# Patient Record
Sex: MALE | Race: WHITE | NOT HISPANIC OR LATINO | URBAN - METROPOLITAN AREA
[De-identification: names, ages, dates, MRNs, and addresses within clinical notes are randomized per-mention and may not be internally consistent; named-entity substitution may affect disease eponyms.]

---

## 2018-09-28 ENCOUNTER — INPATIENT (INPATIENT)
Facility: HOSPITAL | Age: 68
LOS: 1 days | Discharge: HOME | DRG: 603 | End: 2018-09-30
Attending: INTERNAL MEDICINE | Admitting: INTERNAL MEDICINE
Payer: MEDICARE

## 2018-09-28 VITALS
DIASTOLIC BLOOD PRESSURE: 100 MMHG | OXYGEN SATURATION: 96 % | TEMPERATURE: 98 F | SYSTOLIC BLOOD PRESSURE: 183 MMHG | RESPIRATION RATE: 20 BRPM | HEART RATE: 87 BPM

## 2018-09-28 DIAGNOSIS — L03.113 CELLULITIS OF RIGHT UPPER LIMB: ICD-10-CM

## 2018-09-28 LAB
ALBUMIN SERPL ELPH-MCNC: 3.9 G/DL — SIGNIFICANT CHANGE UP (ref 3.3–5)
ALP SERPL-CCNC: 53 U/L — SIGNIFICANT CHANGE UP (ref 40–120)
ALT FLD-CCNC: 9 U/L — LOW (ref 10–45)
ANION GAP SERPL CALC-SCNC: 13 MMOL/L — SIGNIFICANT CHANGE UP (ref 5–17)
AST SERPL-CCNC: 19 U/L — SIGNIFICANT CHANGE UP (ref 10–40)
BASOPHILS # BLD AUTO: 0.1 K/UL — SIGNIFICANT CHANGE UP (ref 0–0.2)
BASOPHILS NFR BLD AUTO: 0.5 % — SIGNIFICANT CHANGE UP (ref 0–2)
BILIRUB SERPL-MCNC: 0.5 MG/DL — SIGNIFICANT CHANGE UP (ref 0.2–1.2)
BUN SERPL-MCNC: 12 MG/DL — SIGNIFICANT CHANGE UP (ref 7–23)
CALCIUM SERPL-MCNC: 9.2 MG/DL — SIGNIFICANT CHANGE UP (ref 8.4–10.5)
CHLORIDE SERPL-SCNC: 102 MMOL/L — SIGNIFICANT CHANGE UP (ref 96–108)
CO2 SERPL-SCNC: 24 MMOL/L — SIGNIFICANT CHANGE UP (ref 22–31)
CREAT SERPL-MCNC: 0.87 MG/DL — SIGNIFICANT CHANGE UP (ref 0.5–1.3)
EOSINOPHIL # BLD AUTO: 0.1 K/UL — SIGNIFICANT CHANGE UP (ref 0–0.5)
EOSINOPHIL NFR BLD AUTO: 0.7 % — SIGNIFICANT CHANGE UP (ref 0–6)
GLUCOSE SERPL-MCNC: 89 MG/DL — SIGNIFICANT CHANGE UP (ref 70–99)
HCT VFR BLD CALC: 35.4 % — LOW (ref 39–50)
HGB BLD-MCNC: 11.9 G/DL — LOW (ref 13–17)
LYMPHOCYTES # BLD AUTO: 1.1 K/UL — SIGNIFICANT CHANGE UP (ref 1–3.3)
LYMPHOCYTES # BLD AUTO: 9 % — LOW (ref 13–44)
MCHC RBC-ENTMCNC: 33.5 GM/DL — SIGNIFICANT CHANGE UP (ref 32–36)
MCHC RBC-ENTMCNC: 34.9 PG — HIGH (ref 27–34)
MCV RBC AUTO: 104 FL — HIGH (ref 80–100)
MONOCYTES # BLD AUTO: 1.3 K/UL — HIGH (ref 0–0.9)
MONOCYTES NFR BLD AUTO: 10.6 % — SIGNIFICANT CHANGE UP (ref 2–14)
NEUTROPHILS # BLD AUTO: 9.8 K/UL — HIGH (ref 1.8–7.4)
NEUTROPHILS NFR BLD AUTO: 79.3 % — HIGH (ref 43–77)
PLATELET # BLD AUTO: 186 K/UL — SIGNIFICANT CHANGE UP (ref 150–400)
POTASSIUM SERPL-MCNC: 4.2 MMOL/L — SIGNIFICANT CHANGE UP (ref 3.5–5.3)
POTASSIUM SERPL-SCNC: 4.2 MMOL/L — SIGNIFICANT CHANGE UP (ref 3.5–5.3)
PROT SERPL-MCNC: 6.7 G/DL — SIGNIFICANT CHANGE UP (ref 6–8.3)
RBC # BLD: 3.4 M/UL — LOW (ref 4.2–5.8)
RBC # FLD: 12.5 % — SIGNIFICANT CHANGE UP (ref 10.3–14.5)
SODIUM SERPL-SCNC: 139 MMOL/L — SIGNIFICANT CHANGE UP (ref 135–145)
WBC # BLD: 12.3 K/UL — HIGH (ref 3.8–10.5)
WBC # FLD AUTO: 12.3 K/UL — HIGH (ref 3.8–10.5)

## 2018-09-28 PROCEDURE — 99285 EMERGENCY DEPT VISIT HI MDM: CPT

## 2018-09-28 PROCEDURE — 99223 1ST HOSP IP/OBS HIGH 75: CPT

## 2018-09-28 RX ORDER — AMPICILLIN SODIUM AND SULBACTAM SODIUM 250; 125 MG/ML; MG/ML
3 INJECTION, POWDER, FOR SUSPENSION INTRAMUSCULAR; INTRAVENOUS ONCE
Qty: 0 | Refills: 0 | Status: COMPLETED | OUTPATIENT
Start: 2018-09-28 | End: 2018-09-28

## 2018-09-28 RX ADMIN — AMPICILLIN SODIUM AND SULBACTAM SODIUM 200 GRAM(S): 250; 125 INJECTION, POWDER, FOR SUSPENSION INTRAMUSCULAR; INTRAVENOUS at 23:24

## 2018-09-28 NOTE — H&P ADULT - PROBLEM SELECTOR PLAN 5
--on ibrutinib for several years, recent relapse. [place] [facility] [place]   --patient is unsure of his dose of ibrutinib but takes 3 tablets at bedtime. Called his pharmacy, but they do not have the prescription on file. Seems his dose is 420mg daily (120mg tabs). Asked him to confirm with his family.  --Given limited signs of systemic infection, ok to continue at this point. --on ibrutinib for several years, recent relapse. Follow at [facility].   --patient is unsure of his dose of ibrutinib but takes 3 tablets at bedtime. Called his pharmacy, but they do not have the prescription on file. Seems his dose is likely 420mg daily (120mg tabs). Asked him to confirm with his family. Spoke to pharmacy and they will need confirmation from his oncologist before medication can be ordered.

## 2018-09-28 NOTE — H&P ADULT - NSHPLABSRESULTS_GEN_ALL_CORE
EKG and labs personally reviewed.     LABS:                        11.9   12.3  )-----------( 186      ( 28 Sep 2018 22:35 )             35.4     139  |  102  |  12  ----------------------------<  89  4.2   |  24  |  0.87    Ca    9.2      28 Sep 2018 22:35    TPro  6.7  /  Alb  3.9  /  TBili  0.5  /  DBili  x   /  AST  19  /  ALT  9<L>  /  AlkPhos  53      RADIOLOGY & ADDITIONAL TESTS:  Imaging Personally Reviewed:    Consultant(s) Notes Reviewed:  Yes, ED  Care Discussed with Consultants/Other Providers: Yes, Dr. Casanova    Placed call to his pharmacy [facility] for medication reconciliation.

## 2018-09-28 NOTE — H&P ADULT - NSHPOUTPATIENTPROVIDERS_GEN_ALL_CORE
Dr. Abraham ([facility] [place] hematology)  Dr. Brady ([facility] dermatology)

## 2018-09-28 NOTE — H&P ADULT - HISTORY OF PRESENT ILLNESS
68M PMH NHL on ibrutinib presents with R hand rash/wound.     HPI:      PAST MEDICAL & SURGICAL HISTORY:  Anemia  History of prostate cancer  Preliminary Diagnosis of Lymphoma  H/O radical prostatectomy: February 2003      Review of Systems:   CONSTITUTIONAL: No fever, weight loss, or fatigue  EYES: No eye pain, visual disturbances, or discharge  ENMT:  No difficulty hearing, tinnitus, vertigo; No sinus or throat pain  NECK: No pain or stiffness  BREASTS: No pain, masses, or nipple discharge  RESPIRATORY: No cough, wheezing, chills or hemoptysis; No shortness of breath  CARDIOVASCULAR: No chest pain, palpitations, dizziness, or leg swelling  GASTROINTESTINAL: No abdominal or epigastric pain. No nausea, vomiting, or hematemesis; No diarrhea or constipation. No melena or hematochezia.  GENITOURINARY: No dysuria, frequency, hematuria, or incontinence  NEUROLOGICAL: No headaches, memory loss, loss of strength, numbness, or tremors  SKIN: No itching, burning, rashes, or lesions   LYMPH NODES: No enlarged glands  ENDOCRINE: No heat or cold intolerance; No hair loss  MUSCULOSKELETAL: No joint pain or swelling; No muscle, back, or extremity pain  PSYCHIATRIC: No depression, anxiety, mood swings, or difficulty sleeping  HEME/LYMPH: No easy bruising, or bleeding gums  ALLERY AND IMMUNOLOGIC: No hives or eczema  [ ] all other systems negative or as per HPI    Allergies    No Known Allergies    Intolerances        Social History:     FAMILY HISTORY:      MEDICATIONS  (STANDING):  allopurinol 100 milliGRAM(s) Oral at bedtime  ibrutinib 420 milliGRAM(s) Oral at bedtime  oxyCODONE    IR 5 milliGRAM(s) Oral once  sodium chloride 0.9% Bolus 1000 milliLiter(s) IV Bolus once  sodium chloride 0.9% Bolus 1000 milliLiter(s) IV Bolus once    MEDICATIONS  (PRN):  acetaminophen   Tablet .. 975 milliGRAM(s) Oral every 8 hours PRN Mild Pain (1 - 3), Moderate Pain (4 - 6)  oxyCODONE    IR 5 milliGRAM(s) Oral every 6 hours PRN Severe Pain (7 - 10)      Vital Signs Last 24 Hrs  T(C): 37.6 (09-29-18 @ 00:25)  T(F): 99.7 (09-29-18 @ 00:25), Max: 99.7 (09-29-18 @ 00:25)  HR: 78 (09-29-18 @ 00:25) (78 - 87)  BP: 170/87 (09-29-18 @ 00:25)  BP(mean): --  RR: 18 (09-29-18 @ 00:25) (17 - 20)  SpO2: 98% (09-29-18 @ 00:25) (96% - 100%)  Wt(kg): --    CAPILLARY BLOOD GLUCOSE          PHYSICAL EXAM:  GENERAL: NAD, well-developed  HEAD:  Atraumatic, Normocephalic  EYES: EOMI, PERRLA, conjunctiva and sclera clear  NECK: Supple, No JVD  CHEST/LUNG: Clear to auscultation bilaterally; No wheeze  HEART: Regular rate and rhythm; No murmurs, rubs, or gallops  ABDOMEN: Soft, Nontender, Nondistended; Bowel sounds present  EXTREMITIES:  2+ Peripheral Pulses, No clubbing, cyanosis, or edema  PSYCH: AAOx3  NEUROLOGY: non-focal  SKIN: No rashes or lesions 68M PMH NHL on ibrutinib, localized prostate ca s/p prostatectomy presents with R hand rash/wound. Started Wednesday with development of a blister on his R wrist. Subsequently ruptured, draining mostly bloody fluid. Then developed erythema and swelling spreading over hand and forearm, streaking up toward upper arm. Today while at work, started to develop moderate R shoulder aching. Not worse with movement, no decrease in strength or ROM. Patient reports discomfort in hand and forearm is minimal, has been able to fully use his hand. No numbness or tingling throughout arm or fingers. No joint pain or swelling. Has felt a little fatigued, but no fevers, chills, chest pain, SOB, NVD, LE edema. He follows with a dermatologist at [facility] [place] where he has had severe pre-malignant and malignant lesions being followed. About to start treatment with 5-FU topicals to some.

## 2018-09-28 NOTE — ED ADULT NURSE NOTE - CHIEF COMPLAINT
The patient is a 68y Male complaining of The patient is a 68y Male complaining of had redness The patient is a 68y Male complaining of hand redness

## 2018-09-28 NOTE — H&P ADULT - NSHPPHYSICALEXAM_GEN_ALL_CORE
Vital Signs Last 24 Hrs  T(C): 37.6 (09-29-18 @ 00:25)  T(F): 99.7 (09-29-18 @ 00:25), Max: 99.7 (09-29-18 @ 00:25)  HR: 78 (09-29-18 @ 00:25) (78 - 87)  BP: 170/87 (09-29-18 @ 00:25)  BP(mean): --  RR: 18 (09-29-18 @ 00:25) (17 - 20)  SpO2: 98% (09-29-18 @ 00:25) (96% - 100%)  Wt(kg): --    PHYSICAL EXAM:  GENERAL: NAD, well-developed  HEAD:  Atraumatic, Normocephalic  EYES: EOMI, PERRLA, conjunctiva and sclera clear  NECK: Supple, No JVD  CHEST/LUNG: Clear to auscultation bilaterally; No wheeze  HEART: Regular rate and rhythm; No murmurs, rubs, or gallops  ABDOMEN: Soft, Nontender, Nondistended; Bowel sounds present  EXTREMITIES:  2+ Peripheral Pulses, No clubbing, cyanosis, or edema  PSYCH: AAOx3  NEUROLOGY: non-focal  SKIN: No rashes or lesions Vital Signs Last 24 Hrs  T(C): 37.6 (09-29-18 @ 00:25)  T(F): 99.7 (09-29-18 @ 00:25), Max: 99.7 (09-29-18 @ 00:25)  HR: 78 (09-29-18 @ 00:25) (78 - 87)  BP: 170/87 (09-29-18 @ 00:25)  BP(mean): --  RR: 18 (09-29-18 @ 00:25) (17 - 20)  SpO2: 98% (09-29-18 @ 00:25) (96% - 100%)  Wt(kg): --    PHYSICAL EXAM:  GENERAL: NAD, well-developed, non-toxic  HEAD:  Atraumatic, Normocephalic  EYES: EOMI, PERRLA, conjunctiva and sclera clear  NECK: Supple, No JVD  CHEST/LUNG: Clear to auscultation bilaterally; No wheeze  HEART: Regular rate and rhythm; No murmurs, rubs, or gallops  ABDOMEN: Soft, Nontender, Nondistended; Bowel sounds present  EXTREMITIES:  2+ Peripheral Pulses, No clubbing, cyanosis, or edema; FROM R shoulder, no tenderness or swelling, normal strength  PSYCH: AAOx3  NEUROLOGY: non-focal  SKIN: 2 cm wide ruptured blister R wrist oozing some bloody liquid, erythema and swelling extending to approx MCP joints and extending up toward the elbow.

## 2018-09-28 NOTE — ED PROVIDER NOTE - ATTENDING CONTRIBUTION TO CARE
I have seen and evaluated this patient with the advance practice clinician.   I agree with the findings  unless other wise stated. I have amended notes where needed.  After my face to face bedside evaluation, I am noting: Pt with right arm redness and swelling that is worsening with an ulcer on lateral aspect of forearm Pt is immunosuppressed needs iv antibiotics will admit--puga

## 2018-09-28 NOTE — ED PROVIDER NOTE - SKIN, MLM
See [facility]. +scattered papular lesions on abdomen and single biopsy scar on left upper chest; Skin otherwise normal color for race, warm, dry and intact. No evidence of rash.

## 2018-09-28 NOTE — ED ADULT NURSE NOTE - OBJECTIVE STATEMENT
comes in with right hand swelling and redness with wound that looks like a popped blister comes in with right hand swelling and redness with wound that looks like a popped blister; pt does not recall and bug bites to the area or trauma to area; denies fever; reports upper back pain that started today; pt is fully alert and ambulatory comes in with right hand swelling and redness with wound that looks like a popped blister; noted x 2 days; pt does not recall and bug bites to the area or trauma to area; denies fever; reports upper back pain that started today; pt is fully alert and ambulatory

## 2018-09-28 NOTE — ED PROVIDER NOTE - MEDICAL DECISION MAKING DETAILS
Pt with right arm redness and swelling that is worsening with an ulcer on lateral aspect of forearm Pt is immunosuppressed needs iv antibiotics will admit--puga

## 2018-09-28 NOTE — ED PROVIDER NOTE - OBJECTIVE STATEMENT
67yo M with PMH NH Lymphoma with recent relapse on oral medications, presenting with wound to R hand with worsening redness and swelling of RUE x 2 days. Pt reports he noticed blister on R hand 2 days ago that popped. Pt put vaseline on it but R hand and wrist has had worsening swelling and redness, now has redness up R arm. Pt denies any known injury to hand, any pain of R hand/wrist/elbow, fever/chills, CP, SOB, abd pain, n/v, any other rash/lesions, numbness/tingling.     No PCP  Onc at [facility]

## 2018-09-28 NOTE — ED PROVIDER NOTE - EXTREMITY EXAM
right upper extremity findings/+ circular wound at dorsum of first MCP with erythematous base with surrounding edema of hand and wrist, +warmth, and + erythema streaking up to proximal forearm, + axillary LAD; full ROM of hand/wrist/elbow/shoulder intact, sensation intact, radial pulse 2+, cap refill < 2s

## 2018-09-28 NOTE — H&P ADULT - ASSESSMENT
68M PMH NHL on ibrutinib, localized prostate ca s/p prostatectomy presents with R hand rash/wound, admitted with cellulitis that is complicated due to immunocompromised status.

## 2018-09-28 NOTE — H&P ADULT - PMH
Anemia    History of prostate cancer    NHL (non-Hodgkin's lymphoma)    Preliminary Diagnosis of Lymphoma

## 2018-09-28 NOTE — H&P ADULT - PROBLEM SELECTOR PLAN 3
--no history of HTN, suspect secondary to pain  --continue to monitor off medication.  --will treat pain.

## 2018-09-28 NOTE — H&P ADULT - PROBLEM SELECTOR PLAN 1
--complicated given immunocompromise on oral chemotherapy  --wound culture sent in ED  --will cover with vancomycin for MRSA coverage given drainage from superficial lesion (not an abscess, more like ruptured bulla)  --BCx sent in ED --complicated given immunocompromise on oral chemotherapy  --wound culture sent in ED  --will cover with vancomycin for MRSA coverage given drainage from superficial lesion (not an abscess, more like ruptured bulla)  --BCx sent in ED  --consider dermatology input in the morning regarding ruptured bulla, but he can also follow up with his dermatologist whom he sees frequently

## 2018-09-28 NOTE — H&P ADULT - PROBLEM SELECTOR PLAN 2
--on ibrutinib for many years. Given limited signs of systemic infection, will continue. --given immunocompromise on chemotherapy, requires admission for reassessment of cellulitis and response to IV antibiotic.

## 2018-09-28 NOTE — ED ADULT NURSE NOTE - NSIMPLEMENTINTERV_GEN_ALL_ED
Implemented All Universal Safety Interventions:  [place] to call system. Call bell, personal items and telephone within reach. Instruct patient to call for assistance. Room bathroom lighting operational. Non-slip footwear when patient is off stretcher. Physically safe environment: no spills, clutter or unnecessary equipment. Stretcher in lowest position, wheels locked, appropriate side rails in place.

## 2018-09-28 NOTE — H&P ADULT - PROBLEM SELECTOR PLAN 4
--suspect referred from RUE infection. Normal physical exam.  --tylenol and oxycodone for pain, reassess in the morning.

## 2018-09-28 NOTE — H&P ADULT - NSHPREVIEWOFSYSTEMS_GEN_ALL_CORE
Review of Systems:   CONSTITUTIONAL: No fever, weight loss, or fatigue  EYES: No eye pain, visual disturbances, or discharge  ENMT:  No difficulty hearing, tinnitus, vertigo; No sinus or throat pain  NECK: No pain or stiffness  BREASTS: No pain, masses, or nipple discharge  RESPIRATORY: No cough, wheezing, chills or hemoptysis; No shortness of breath  CARDIOVASCULAR: No chest pain, palpitations, dizziness, or leg swelling  GASTROINTESTINAL: No abdominal or epigastric pain. No nausea, vomiting, or hematemesis; No diarrhea or constipation. No melena or hematochezia.  GENITOURINARY: No dysuria, frequency, hematuria, or incontinence  NEUROLOGICAL: No headaches, memory loss, loss of strength, numbness, or tremors  SKIN: No itching, burning, rashes, or lesions   LYMPH NODES: No enlarged glands  ENDOCRINE: No heat or cold intolerance; No hair loss  MUSCULOSKELETAL: No joint pain or swelling; No muscle, back, or extremity pain  PSYCHIATRIC: No depression, anxiety, mood swings, or difficulty sleeping  HEME/LYMPH: No easy bruising, or bleeding gums  ALLERY AND IMMUNOLOGIC: No hives or eczema  [ ] all other systems negative or as per HPI Review of Systems:   CONSTITUTIONAL: +fatigue; No fever, weight loss  EYES: No eye pain, visual disturbances, or discharge  ENMT:  No difficulty hearing, tinnitus, vertigo; No sinus or throat pain  NECK: No pain or stiffness  BREASTS: No pain, masses, or nipple discharge  RESPIRATORY: No cough, wheezing, chills or hemoptysis; No shortness of breath  CARDIOVASCULAR: No chest pain, palpitations, dizziness, or leg swelling  GASTROINTESTINAL: No abdominal or epigastric pain. No nausea, vomiting, or hematemesis; No diarrhea or constipation. No melena or hematochezia.  GENITOURINARY: No dysuria, frequency, hematuria, or incontinence  NEUROLOGICAL: No headaches, memory loss, loss of strength, numbness, or tremors  SKIN: +blister, rash, warmth   LYMPH NODES: No enlarged glands  ENDOCRINE: No heat or cold intolerance; No hair loss  MUSCULOSKELETAL: No joint pain or swelling; No muscle, back, or extremity pain  PSYCHIATRIC: No depression, anxiety, mood swings, or difficulty sleeping  HEME/LYMPH: No easy bruising, or bleeding gums  ALLERY AND IMMUNOLOGIC: No hives or eczema  [ ] all other systems negative or as per HPI

## 2018-09-29 DIAGNOSIS — I10 ESSENTIAL (PRIMARY) HYPERTENSION: ICD-10-CM

## 2018-09-29 DIAGNOSIS — Z98.890 OTHER SPECIFIED POSTPROCEDURAL STATES: Chronic | ICD-10-CM

## 2018-09-29 DIAGNOSIS — C85.90 NON-HODGKIN LYMPHOMA, UNSPECIFIED, UNSPECIFIED SITE: ICD-10-CM

## 2018-09-29 DIAGNOSIS — L03.113 CELLULITIS OF RIGHT UPPER LIMB: ICD-10-CM

## 2018-09-29 DIAGNOSIS — M25.519 PAIN IN UNSPECIFIED SHOULDER: ICD-10-CM

## 2018-09-29 DIAGNOSIS — Z29.9 ENCOUNTER FOR PROPHYLACTIC MEASURES, UNSPECIFIED: ICD-10-CM

## 2018-09-29 DIAGNOSIS — D84.9 IMMUNODEFICIENCY, UNSPECIFIED: ICD-10-CM

## 2018-09-29 LAB
ANION GAP SERPL CALC-SCNC: 9 MMOL/L — SIGNIFICANT CHANGE UP (ref 5–17)
BASE EXCESS BLDV CALC-SCNC: -0.5 MMOL/L — SIGNIFICANT CHANGE UP (ref -2–2)
BUN SERPL-MCNC: 9 MG/DL — SIGNIFICANT CHANGE UP (ref 7–23)
CA-I SERPL-SCNC: 1.05 MMOL/L — LOW (ref 1.12–1.3)
CALCIUM SERPL-MCNC: 9.4 MG/DL — SIGNIFICANT CHANGE UP (ref 8.4–10.5)
CHLORIDE BLDV-SCNC: 112 MMOL/L — HIGH (ref 96–108)
CHLORIDE SERPL-SCNC: 101 MMOL/L — SIGNIFICANT CHANGE UP (ref 96–108)
CO2 BLDV-SCNC: 24 MMOL/L — SIGNIFICANT CHANGE UP (ref 22–30)
CO2 SERPL-SCNC: 25 MMOL/L — SIGNIFICANT CHANGE UP (ref 22–31)
CREAT SERPL-MCNC: 0.83 MG/DL — SIGNIFICANT CHANGE UP (ref 0.5–1.3)
GAS PNL BLDV: 138 MMOL/L — SIGNIFICANT CHANGE UP (ref 136–145)
GAS PNL BLDV: SIGNIFICANT CHANGE UP
GAS PNL BLDV: SIGNIFICANT CHANGE UP
GLUCOSE BLDV-MCNC: 76 MG/DL — SIGNIFICANT CHANGE UP (ref 70–99)
GLUCOSE SERPL-MCNC: 131 MG/DL — HIGH (ref 70–99)
HCO3 BLDV-SCNC: 23 MMOL/L — SIGNIFICANT CHANGE UP (ref 21–29)
HCT VFR BLD CALC: 37 % — LOW (ref 39–50)
HCT VFR BLDA CALC: 32 % — LOW (ref 39–50)
HGB BLD CALC-MCNC: 10.2 G/DL — LOW (ref 13–17)
HGB BLD-MCNC: 12.3 G/DL — LOW (ref 13–17)
LACTATE BLDV-MCNC: 0.9 MMOL/L — SIGNIFICANT CHANGE UP (ref 0.7–2)
MCHC RBC-ENTMCNC: 33.2 GM/DL — SIGNIFICANT CHANGE UP (ref 32–36)
MCHC RBC-ENTMCNC: 34.1 PG — HIGH (ref 27–34)
MCV RBC AUTO: 102.5 FL — HIGH (ref 80–100)
OTHER CELLS CSF MANUAL: 7 ML/DL — LOW (ref 18–22)
PCO2 BLDV: 36 MMHG — SIGNIFICANT CHANGE UP (ref 35–50)
PH BLDV: 7.42 — SIGNIFICANT CHANGE UP (ref 7.35–7.45)
PLATELET # BLD AUTO: 200 K/UL — SIGNIFICANT CHANGE UP (ref 150–400)
PO2 BLDV: 28 MMHG — SIGNIFICANT CHANGE UP (ref 25–45)
POTASSIUM BLDV-SCNC: 3.2 MMOL/L — LOW (ref 3.5–5.3)
POTASSIUM SERPL-MCNC: 4 MMOL/L — SIGNIFICANT CHANGE UP (ref 3.5–5.3)
POTASSIUM SERPL-SCNC: 4 MMOL/L — SIGNIFICANT CHANGE UP (ref 3.5–5.3)
RBC # BLD: 3.61 M/UL — LOW (ref 4.2–5.8)
RBC # FLD: 13.8 % — SIGNIFICANT CHANGE UP (ref 10.3–14.5)
SAO2 % BLDV: 47 % — LOW (ref 67–88)
SODIUM SERPL-SCNC: 135 MMOL/L — SIGNIFICANT CHANGE UP (ref 135–145)
WBC # BLD: 9.2 K/UL — SIGNIFICANT CHANGE UP (ref 3.8–10.5)
WBC # FLD AUTO: 9.2 K/UL — SIGNIFICANT CHANGE UP (ref 3.8–10.5)

## 2018-09-29 PROCEDURE — 99233 SBSQ HOSP IP/OBS HIGH 50: CPT

## 2018-09-29 RX ORDER — ACETAMINOPHEN 500 MG
975 TABLET ORAL EVERY 8 HOURS
Qty: 0 | Refills: 0 | Status: DISCONTINUED | OUTPATIENT
Start: 2018-09-29 | End: 2018-09-30

## 2018-09-29 RX ORDER — IBRUTINIB 140 MG/1
3 TABLET, FILM COATED ORAL
Qty: 0 | Refills: 0 | COMMUNITY

## 2018-09-29 RX ORDER — OXYCODONE HYDROCHLORIDE 5 MG/1
5 TABLET ORAL ONCE
Qty: 0 | Refills: 0 | Status: DISCONTINUED | OUTPATIENT
Start: 2018-09-29 | End: 2018-09-29

## 2018-09-29 RX ORDER — ENOXAPARIN SODIUM 100 MG/ML
40 INJECTION SUBCUTANEOUS EVERY 24 HOURS
Qty: 0 | Refills: 0 | Status: DISCONTINUED | OUTPATIENT
Start: 2018-09-29 | End: 2018-09-30

## 2018-09-29 RX ORDER — IBRUTINIB 140 MG/1
420 TABLET, FILM COATED ORAL AT BEDTIME
Qty: 0 | Refills: 0 | Status: DISCONTINUED | OUTPATIENT
Start: 2018-09-29 | End: 2018-09-30

## 2018-09-29 RX ORDER — IBRUTINIB 140 MG/1
0 TABLET, FILM COATED ORAL
Qty: 0 | Refills: 0 | COMMUNITY

## 2018-09-29 RX ORDER — OXYCODONE HYDROCHLORIDE 5 MG/1
5 TABLET ORAL EVERY 6 HOURS
Qty: 0 | Refills: 0 | Status: DISCONTINUED | OUTPATIENT
Start: 2018-09-29 | End: 2018-09-30

## 2018-09-29 RX ORDER — SODIUM CHLORIDE 9 MG/ML
1000 INJECTION INTRAMUSCULAR; INTRAVENOUS; SUBCUTANEOUS ONCE
Qty: 0 | Refills: 0 | Status: COMPLETED | OUTPATIENT
Start: 2018-09-29 | End: 2018-09-29

## 2018-09-29 RX ORDER — ALLOPURINOL 300 MG
100 TABLET ORAL AT BEDTIME
Qty: 0 | Refills: 0 | Status: DISCONTINUED | OUTPATIENT
Start: 2018-09-29 | End: 2018-09-30

## 2018-09-29 RX ORDER — ALLOPURINOL 300 MG
1 TABLET ORAL
Qty: 0 | Refills: 0 | COMMUNITY

## 2018-09-29 RX ORDER — VANCOMYCIN HCL 1 G
1000 VIAL (EA) INTRAVENOUS EVERY 12 HOURS
Qty: 0 | Refills: 0 | Status: DISCONTINUED | OUTPATIENT
Start: 2018-09-29 | End: 2018-09-30

## 2018-09-29 RX ORDER — MUPIROCIN 20 MG/G
1 OINTMENT TOPICAL
Qty: 0 | Refills: 0 | Status: DISCONTINUED | OUTPATIENT
Start: 2018-09-29 | End: 2018-09-30

## 2018-09-29 RX ORDER — ALLOPURINOL 300 MG
0 TABLET ORAL
Qty: 0 | Refills: 0 | COMMUNITY

## 2018-09-29 RX ORDER — INFLUENZA VIRUS VACCINE 15; 15; 15; 15 UG/.5ML; UG/.5ML; UG/.5ML; UG/.5ML
0.5 SUSPENSION INTRAMUSCULAR ONCE
Qty: 0 | Refills: 0 | Status: DISCONTINUED | OUTPATIENT
Start: 2018-09-29 | End: 2018-09-30

## 2018-09-29 RX ADMIN — Medication 250 MILLIGRAM(S): at 17:28

## 2018-09-29 RX ADMIN — OXYCODONE HYDROCHLORIDE 5 MILLIGRAM(S): 5 TABLET ORAL at 01:18

## 2018-09-29 RX ADMIN — MUPIROCIN 1 APPLICATION(S): 20 OINTMENT TOPICAL at 17:28

## 2018-09-29 RX ADMIN — Medication 250 MILLIGRAM(S): at 05:53

## 2018-09-29 RX ADMIN — SODIUM CHLORIDE 1000 MILLILITER(S): 9 INJECTION INTRAMUSCULAR; INTRAVENOUS; SUBCUTANEOUS at 01:23

## 2018-09-29 RX ADMIN — Medication 975 MILLIGRAM(S): at 22:01

## 2018-09-29 RX ADMIN — ENOXAPARIN SODIUM 40 MILLIGRAM(S): 100 INJECTION SUBCUTANEOUS at 05:53

## 2018-09-29 RX ADMIN — Medication 100 MILLIGRAM(S): at 21:55

## 2018-09-29 RX ADMIN — Medication 975 MILLIGRAM(S): at 22:47

## 2018-09-29 RX ADMIN — OXYCODONE HYDROCHLORIDE 5 MILLIGRAM(S): 5 TABLET ORAL at 19:30

## 2018-09-29 RX ADMIN — OXYCODONE HYDROCHLORIDE 5 MILLIGRAM(S): 5 TABLET ORAL at 18:33

## 2018-09-29 RX ADMIN — AMPICILLIN SODIUM AND SULBACTAM SODIUM 3 GRAM(S): 250; 125 INJECTION, POWDER, FOR SUSPENSION INTRAMUSCULAR; INTRAVENOUS at 00:12

## 2018-09-29 RX ADMIN — MUPIROCIN 1 APPLICATION(S): 20 OINTMENT TOPICAL at 05:53

## 2018-09-29 NOTE — PROGRESS NOTE ADULT - SUBJECTIVE AND OBJECTIVE BOX
Stephen Layne MD  Division of Hospital Medicine  Pager [phone]      ANGELICA MONTOYA  68y  Male      Patient is a 68y old  Male who presents with a chief complaint of Right arm cellulitis (29 Sep 2018 02:24)      INTERVAL HPI/OVERNIGHT EVENTS:  Seen at bedside. Reporting some improvement in redness and swelling in arm. No fevers, chills. Shoulder pain improved      REVIEW OF SYSTEMS: 14 point ROS negative unless listed above    T(C): 37 (09-29-18 @ 07:59), Max: 37.6 (09-29-18 @ 00:25)  HR: 73 (09-29-18 @ 07:59) (73 - 87)  BP: 165/77 (09-29-18 @ 07:59) (159/76 - 183/100)  RR: 18 (09-29-18 @ 07:59) (17 - 20)  SpO2: 97% (09-29-18 @ 07:59) (95% - 100%)  Wt(kg): --Vital Signs Last 24 Hrs  T(C): 37 (29 Sep 2018 07:59), Max: 37.6 (29 Sep 2018 00:25)  T(F): 98.6 (29 Sep 2018 07:59), Max: 99.7 (29 Sep 2018 00:25)  HR: 73 (29 Sep 2018 07:59) (73 - 87)  BP: 165/77 (29 Sep 2018 07:59) (159/76 - 183/100)  BP(mean): --  RR: 18 (29 Sep 2018 07:59) (17 - 20)  SpO2: 97% (29 Sep 2018 07:59) (95% - 100%)    PHYSICAL EXAM:  GENERAL: NAD, well-developed, non-toxic  NECK: Supple, No JVD  CHEST/LUNG: Clear to auscultation bilaterally; No wheeze  HEART: Regular rate and rhythm; No murmurs, rubs, or gallops  ABDOMEN: Soft, Nontender, Nondistended; Bowel sounds present  EXTREMITIES:  2+ Peripheral Pulses, No clubbing, cyanosis, or edema; FROM R shoulder, no tenderness or swelling, normal strength  PSYCH: AAOx3  NEUROLOGY: non-focal  SKIN: 2 cm ruptured blister on R wrist oozing some bloody liquid, erythema and swelling extending to approx MCP joints and extending up toward the elbow. Area demarcated, improved    Consultant(s) Notes Reviewed:  [x ] YES  [ ] NO  Care Discussed with Consultants/Other Providers [ x] YES  [ ] NO    LABS:                        12.3   9.20  )-----------( 200      ( 29 Sep 2018 10:55 )             37.0     09-29    135  |  101  |  9   ----------------------------<  131<H>  4.0   |  25  |  0.83    Ca    9.4      29 Sep 2018 09:43    TPro  6.7  /  Alb  3.9  /  TBili  0.5  /  DBili  x   /  AST  19  /  ALT  9<L>  /  AlkPhos  53  09-28        CAPILLARY BLOOD GLUCOSE                RADIOLOGY & ADDITIONAL TESTS:    Imaging Personally Reviewed:  [x ] YES  [ ] NO

## 2018-09-29 NOTE — PROGRESS NOTE ADULT - PROBLEM SELECTOR PLAN 5
--on ibrutinib for several years, recent relapse. Follow at [facility].   --patient is unsure of his dose of ibrutinib but takes 3 tablets at bedtime. Son to bring in medication today  -- Has appointment with new oncologist Monday as his current oncologist is retiring

## 2018-09-29 NOTE — PROGRESS NOTE ADULT - PROBLEM SELECTOR PLAN 4
--suspect referred from RUE infection. No notable findings on physical examination  --tylenol and oxycodone for pain  -- Warm compresses

## 2018-09-29 NOTE — PROGRESS NOTE ADULT - PROBLEM SELECTOR PLAN 1
--complicated given immunocompromise on oral chemotherapy. Currently on vancomycin to cover for purulent cellulitis given immunocompromised status and drainage from skin blister. Leukocytosis resolved  --wound culture sent in ED  -- f/u BCx  - Wound care consult

## 2018-09-29 NOTE — PROGRESS NOTE ADULT - PROBLEM SELECTOR PLAN 2
--given immunocompromise on chemotherapy, requires admission for reassessment of cellulitis and response to IV antibiotic.

## 2018-09-30 ENCOUNTER — TRANSCRIPTION ENCOUNTER (OUTPATIENT)
Age: 68
End: 2018-09-30

## 2018-09-30 VITALS
SYSTOLIC BLOOD PRESSURE: 160 MMHG | DIASTOLIC BLOOD PRESSURE: 75 MMHG | RESPIRATION RATE: 18 BRPM | HEART RATE: 66 BPM | OXYGEN SATURATION: 98 % | TEMPERATURE: 99 F

## 2018-09-30 LAB
ANION GAP SERPL CALC-SCNC: 13 MMOL/L — SIGNIFICANT CHANGE UP (ref 5–17)
BUN SERPL-MCNC: 10 MG/DL — SIGNIFICANT CHANGE UP (ref 7–23)
CALCIUM SERPL-MCNC: 8.9 MG/DL — SIGNIFICANT CHANGE UP (ref 8.4–10.5)
CHLORIDE SERPL-SCNC: 103 MMOL/L — SIGNIFICANT CHANGE UP (ref 96–108)
CO2 SERPL-SCNC: 21 MMOL/L — LOW (ref 22–31)
CREAT SERPL-MCNC: 0.88 MG/DL — SIGNIFICANT CHANGE UP (ref 0.5–1.3)
CULTURE RESULTS: SIGNIFICANT CHANGE UP
GLUCOSE SERPL-MCNC: 88 MG/DL — SIGNIFICANT CHANGE UP (ref 70–99)
HCT VFR BLD CALC: 32.1 % — LOW (ref 39–50)
HGB BLD-MCNC: 10.6 G/DL — LOW (ref 13–17)
MCHC RBC-ENTMCNC: 33 GM/DL — SIGNIFICANT CHANGE UP (ref 32–36)
MCHC RBC-ENTMCNC: 34 PG — SIGNIFICANT CHANGE UP (ref 27–34)
MCV RBC AUTO: 102.9 FL — HIGH (ref 80–100)
PLATELET # BLD AUTO: 178 K/UL — SIGNIFICANT CHANGE UP (ref 150–400)
POTASSIUM SERPL-MCNC: 3.6 MMOL/L — SIGNIFICANT CHANGE UP (ref 3.5–5.3)
POTASSIUM SERPL-SCNC: 3.6 MMOL/L — SIGNIFICANT CHANGE UP (ref 3.5–5.3)
RBC # BLD: 3.12 M/UL — LOW (ref 4.2–5.8)
RBC # FLD: 13.8 % — SIGNIFICANT CHANGE UP (ref 10.3–14.5)
SODIUM SERPL-SCNC: 137 MMOL/L — SIGNIFICANT CHANGE UP (ref 135–145)
SPECIMEN SOURCE: SIGNIFICANT CHANGE UP
WBC # BLD: 7.54 K/UL — SIGNIFICANT CHANGE UP (ref 3.8–10.5)
WBC # FLD AUTO: 7.54 K/UL — SIGNIFICANT CHANGE UP (ref 3.8–10.5)

## 2018-09-30 PROCEDURE — 85027 COMPLETE CBC AUTOMATED: CPT

## 2018-09-30 PROCEDURE — 84132 ASSAY OF SERUM POTASSIUM: CPT

## 2018-09-30 PROCEDURE — 82803 BLOOD GASES ANY COMBINATION: CPT

## 2018-09-30 PROCEDURE — 99239 HOSP IP/OBS DSCHRG MGMT >30: CPT

## 2018-09-30 PROCEDURE — 82947 ASSAY GLUCOSE BLOOD QUANT: CPT

## 2018-09-30 PROCEDURE — 80048 BASIC METABOLIC PNL TOTAL CA: CPT

## 2018-09-30 PROCEDURE — 84295 ASSAY OF SERUM SODIUM: CPT

## 2018-09-30 PROCEDURE — 87040 BLOOD CULTURE FOR BACTERIA: CPT

## 2018-09-30 PROCEDURE — 82435 ASSAY OF BLOOD CHLORIDE: CPT

## 2018-09-30 PROCEDURE — 85014 HEMATOCRIT: CPT

## 2018-09-30 PROCEDURE — 99285 EMERGENCY DEPT VISIT HI MDM: CPT | Mod: 25

## 2018-09-30 PROCEDURE — 80053 COMPREHEN METABOLIC PANEL: CPT

## 2018-09-30 PROCEDURE — 82330 ASSAY OF CALCIUM: CPT

## 2018-09-30 PROCEDURE — 96374 THER/PROPH/DIAG INJ IV PUSH: CPT

## 2018-09-30 PROCEDURE — 83605 ASSAY OF LACTIC ACID: CPT

## 2018-09-30 PROCEDURE — 87070 CULTURE OTHR SPECIMN AEROBIC: CPT

## 2018-09-30 RX ORDER — ACETAMINOPHEN 500 MG
3 TABLET ORAL
Qty: 45 | Refills: 0 | OUTPATIENT
Start: 2018-09-30 | End: 2018-10-04

## 2018-09-30 RX ORDER — MUPIROCIN 20 MG/G
1 OINTMENT TOPICAL
Qty: 90 | Refills: 0 | OUTPATIENT
Start: 2018-09-30 | End: 2018-10-09

## 2018-09-30 RX ADMIN — Medication 250 MILLIGRAM(S): at 05:33

## 2018-09-30 RX ADMIN — ENOXAPARIN SODIUM 40 MILLIGRAM(S): 100 INJECTION SUBCUTANEOUS at 05:33

## 2018-09-30 RX ADMIN — MUPIROCIN 1 APPLICATION(S): 20 OINTMENT TOPICAL at 05:41

## 2018-09-30 RX ADMIN — Medication 975 MILLIGRAM(S): at 07:53

## 2018-09-30 NOTE — DISCHARGE NOTE ADULT - PATIENT PORTAL LINK FT
You can access the [facility], offered by [facility], by registering with the following website: [email or web address]

## 2018-09-30 NOTE — DISCHARGE NOTE ADULT - MEDICATION SUMMARY - MEDICATIONS TO TAKE
I will START or STAY ON the medications listed below when I get home from the hospital:    acetaminophen 325 mg oral tablet  -- 3 tab(s) by mouth every 8 hours, As needed, Mild Pain (1 - 3), Moderate Pain (4 - 6)  -- Indication: For pain    allopurinol 100 mg oral tablet  -- 1 tab(s) by mouth once a day (at bedtime)  -- Indication: For ppx    mupirocin 2% topical ointment  -- 1 application on skin 2 times a day  -- Indication: For Cellulitis of right upper extremity    Cleocin HCl 300 mg oral capsule  -- 1 cap(s) by mouth 4 times a day   -- Finish all this medication unless otherwise directed by prescriber.  Medication should be taken with plenty of water.    -- Indication: For Cellulitis of right upper extremity    ibrutinib 140 mg oral tablet  -- 3 tab(s) by mouth once a day (at bedtime)  -- Indication: For NHL (non-Hodgkin's lymphoma)

## 2018-09-30 NOTE — PROGRESS NOTE ADULT - PROBLEM SELECTOR PLAN 5
--on ibrutinib for several years, recent relapse. Follow at [facility].   --Son brought in medication  -- Has appointment with new oncologist Monday as his current oncologist is retiring

## 2018-09-30 NOTE — DISCHARGE NOTE ADULT - CARE PLAN
Principal Discharge DX:	Cellulitis of right upper extremity  Goal:	Full resolution  Assessment and plan of treatment:	You have been diagnosed with cellulitis. This is an infection in the deepest layer of the skin. In some cases, the infection also affects the muscle. Cellulitis is caused by bacteria. The bacteria can enter the body through broken skin. This can happen with a cut, scratch, animal bite, or an insect bite that has been scratched. You may have been treated in the hospital with antibiotics and fluids. You will likely be given a prescription for antibiotics to take at home. This sheet will help you take care of yourself at home.  Difficulty or pain when moving the joints above or below the infected area  Discharge or pus draining from the area  Fever of 100.4°F (38°C) or higher, or as directed by your healthcare provider  Pain that gets worse in or around the infected   Redness that gets worse in or around the infected area, particularly if the area of redness expands to a wider area  Shaking chills  Swelling of the infected area  Vomiting  Secondary Diagnosis:	HTN (hypertension)  Secondary Diagnosis:	NHL (non-Hodgkin's lymphoma)

## 2018-09-30 NOTE — PROGRESS NOTE ADULT - PROBLEM SELECTOR PLAN 1
--complicated given that pt immunocompromised on oral chemotherapy. Much improved clinically on vancomycin to cover for purulent cellulitis given immunocompromised status and drainage from skin blister. Leukocytosis resolved  --wound culture sent in ED + for strep pyogenes  -- Will transition to oral Clindamycin on discharge to cover MRSA and Strep as well as strep exotoxin  -- f/u BCx. NGTD currently

## 2018-09-30 NOTE — PROGRESS NOTE ADULT - SUBJECTIVE AND OBJECTIVE BOX
Stephen Layne MD  Division of Hospital Medicine  Pager [phone]      ANGELICA MONTOYA  68y  Male      Patient is a 68y old  Male who presents with a chief complaint of rash (30 Sep 2018 10:43)      INTERVAL HPI/OVERNIGHT EVENTS:  Seen at bedside with daughter present. Erythema and swelling much improved today. No fevers/chills. Denies pain       REVIEW OF SYSTEMS: 14 point ROS negative unless listed above    T(C): 37.4 (09-30-18 @ 07:26), Max: 37.4 (09-30-18 @ 07:26)  HR: 66 (09-30-18 @ 07:26) (66 - 76)  BP: 160/75 (09-30-18 @ 07:26) (160/75 - 167/81)  RR: 18 (09-30-18 @ 07:26) (18 - 18)  SpO2: 98% (09-30-18 @ 07:26) (96% - 100%)  Wt(kg): --Vital Signs Last 24 Hrs  T(C): 37.4 (30 Sep 2018 07:26), Max: 37.4 (30 Sep 2018 07:26)  T(F): 99.3 (30 Sep 2018 07:26), Max: 99.3 (30 Sep 2018 07:26)  HR: 66 (30 Sep 2018 07:26) (66 - 76)  BP: 160/75 (30 Sep 2018 07:26) (160/75 - 167/81)  BP(mean): --  RR: 18 (30 Sep 2018 07:26) (18 - 18)  SpO2: 98% (30 Sep 2018 07:26) (96% - 100%)    PHYSICAL EXAM:  GENERAL: NAD, well-groomed, well-developed  ENMT: No tonsillar erythema, exudates,; Moist mucous membranes. No lesions  NECK: Supple, No JVD, Normal thyroid  CHEST/LUNG: Clear to percussion bilaterally; No rales, rhonchi, wheezing, or rubs  HEART: Regular rate and rhythm; No murmurs, rubs, or gallops  ABDOMEN: Soft, Nontender, Nondistended; Bowel sounds present.  No hepatosplenomegaly  EXTREMITIES:  2+ Peripheral Pulses, No clubbing, cyanosis, or edema  LYMPH: No lymphadenopathy noted  SKIN: SKIN: 2 cm ruptured blister on R wrist erythema and swelling extending to approx MCP joints improved from yesterday. Erythema extending mid forearm improved from yesterday  PSYCH: Alert & Oriented x3    Consultant(s) Notes Reviewed:  [x ] YES  [ ] NO  Care Discussed with Consultants/Other Providers [ x] YES  [ ] NO    LABS:                        10.6   7.54  )-----------( 178      ( 30 Sep 2018 09:15 )             32.1     09-30    137  |  103  |  10  ----------------------------<  88  3.6   |  21<L>  |  0.88    Ca    8.9      30 Sep 2018 07:16    TPro  6.7  /  Alb  3.9  /  TBili  0.5  /  DBili  x   /  AST  19  /  ALT  9<L>  /  AlkPhos  53  09-28        CAPILLARY BLOOD GLUCOSE                RADIOLOGY & ADDITIONAL TESTS:    Imaging Personally Reviewed:  [x ] YES  [ ] NO

## 2018-09-30 NOTE — DISCHARGE NOTE ADULT - HOSPITAL COURSE
68M PMH NHL on ibrutinib, localized prostate ca s/p prostatectomy presents with R hand rash/wound, admitted with cellulitis that is complicated due to immunocompromised status. Patient stable for discharge on oral antibiotics with outpt f/u.

## 2018-09-30 NOTE — PROGRESS NOTE ADULT - PROBLEM SELECTOR PLAN 4
--suspect referred from RUE infection. No notable findings on physical examination. Improved with improvement in infection  --tylenol and oxycodone for pain  -- Warm compresses

## 2018-09-30 NOTE — DISCHARGE NOTE ADULT - PLAN OF CARE
Full resolution You have been diagnosed with cellulitis. This is an infection in the deepest layer of the skin. In some cases, the infection also affects the muscle. Cellulitis is caused by bacteria. The bacteria can enter the body through broken skin. This can happen with a cut, scratch, animal bite, or an insect bite that has been scratched. You may have been treated in the hospital with antibiotics and fluids. You will likely be given a prescription for antibiotics to take at home. This sheet will help you take care of yourself at home.  Difficulty or pain when moving the joints above or below the infected area  Discharge or pus draining from the area  Fever of 100.4°F (38°C) or higher, or as directed by your healthcare provider  Pain that gets worse in or around the infected   Redness that gets worse in or around the infected area, particularly if the area of redness expands to a wider area  Shaking chills  Swelling of the infected area  Vomiting

## 2018-10-03 PROBLEM — C85.90 NON-HODGKIN LYMPHOMA, UNSPECIFIED, UNSPECIFIED SITE: Chronic | Status: ACTIVE | Noted: 2018-09-29

## 2018-10-04 ENCOUNTER — APPOINTMENT (OUTPATIENT)
Dept: CT IMAGING | Facility: CLINIC | Age: 68
End: 2018-10-04
Payer: MEDICARE

## 2018-10-04 ENCOUNTER — OUTPATIENT (OUTPATIENT)
Dept: OUTPATIENT SERVICES | Facility: HOSPITAL | Age: 68
LOS: 1 days | End: 2018-10-04
Payer: MEDICARE

## 2018-10-04 DIAGNOSIS — Z00.8 ENCOUNTER FOR OTHER GENERAL EXAMINATION: ICD-10-CM

## 2018-10-04 DIAGNOSIS — Z98.890 OTHER SPECIFIED POSTPROCEDURAL STATES: Chronic | ICD-10-CM

## 2018-10-04 LAB
CULTURE RESULTS: SIGNIFICANT CHANGE UP
CULTURE RESULTS: SIGNIFICANT CHANGE UP
SPECIMEN SOURCE: SIGNIFICANT CHANGE UP
SPECIMEN SOURCE: SIGNIFICANT CHANGE UP

## 2018-10-04 PROCEDURE — 74177 CT ABD & PELVIS W/CONTRAST: CPT

## 2018-10-04 PROCEDURE — 74177 CT ABD & PELVIS W/CONTRAST: CPT | Mod: 26

## 2018-10-11 ENCOUNTER — INBOUND DOCUMENT (OUTPATIENT)
Age: 68
End: 2018-10-11

## 2019-01-09 ENCOUNTER — INBOUND DOCUMENT (OUTPATIENT)
Age: 69
End: 2019-01-09

## 2020-01-31 NOTE — ED PROVIDER NOTE - DIAGNOSIS COUNSELING, MDM
None conducted a detailed discussion... I had a detailed discussion with the patient and/or guardian regarding the historical points, exam findings, and any diagnostic results supporting the discharge/admit diagnosis.

## 2020-05-08 NOTE — DISCHARGE NOTE ADULT - NS AS DC PROVIDER CONTACT Y/N MULTI
05/08/20 0900   Anxiety Tool INDERJIT - 7   Feeling Nervous, Anxious, or on Edge 2 - More than half the days   Not Being Able to Stop or Control Worrying 1 - Several days   Worrying Too Much About Different Things 2 - More than half the days   Trouble Relaxing 1 - Several days   Being So Restless it is Hard to Sit Still 1 - Several days   Becoming Easily Annoyed or Irritable 1 - Several days   Feeling Afraid as if Something Awful Might Happen 0 - Not at all   Total Score 8   Gertrudis Colby, KOBY-IT, SAC-IT     Yes

## 2021-08-27 NOTE — H&P ADULT - NSHPATTENDINGPLANDISCUSS_GEN_ALL_CORE
[place] MEDICAID  Insurance Phone #:   [phone]    BIN:     [id number]  PCN:     N/A  ID:     [id number]  GROUP:    N/A    Additional Notes:   PER [place] MEDICAID, NEEDS PRIOR AUTH BEFORE       PHARMACY CAN GET A PAID CLAIM     Dr. Casanova, patient, patient's children at bedside.

## 2021-12-09 NOTE — PROGRESS NOTE ADULT - ATTENDING COMMENTS
Given marked improvement in erythema and swelling with 3 doses of IV abx will transition to Oral Clindamycin to treat MRSA as well as strep given wound culture findings in the ED. Will treat for total 10 day course. Pt eats yogurt in am, advised to continue while on abx or take probiotic given risk of antibiotic associated diarrhea. Has follow up with Oncologist tomorrow 10/1. Wanted to be discharged to attend follow up appointment. Advised that should erythema, pain, swelling increase to come back to the ED for evaluation.   Discussed with Daughter at bedside. Pt and daughter expressed understanding  Discharge time spent 38 minutes rolling walker

## 2022-05-11 ENCOUNTER — APPOINTMENT (OUTPATIENT)
Dept: MRI IMAGING | Facility: CLINIC | Age: 72
End: 2022-05-11
Payer: MEDICARE

## 2022-05-11 ENCOUNTER — OUTPATIENT (OUTPATIENT)
Dept: OUTPATIENT SERVICES | Facility: HOSPITAL | Age: 72
LOS: 1 days | End: 2022-05-11
Payer: MEDICARE

## 2022-05-11 DIAGNOSIS — K56.1 INTUSSUSCEPTION: ICD-10-CM

## 2022-05-11 DIAGNOSIS — Z98.890 OTHER SPECIFIED POSTPROCEDURAL STATES: Chronic | ICD-10-CM

## 2022-05-11 PROCEDURE — 72197 MRI PELVIS W/O & W/DYE: CPT | Mod: 26,MH

## 2022-05-11 PROCEDURE — 74183 MRI ABD W/O CNTR FLWD CNTR: CPT | Mod: 26,MH

## 2022-05-11 PROCEDURE — A9585: CPT

## 2022-05-11 PROCEDURE — 72197 MRI PELVIS W/O & W/DYE: CPT | Mod: MH

## 2022-05-11 PROCEDURE — 74183 MRI ABD W/O CNTR FLWD CNTR: CPT | Mod: MH
